# Patient Record
Sex: FEMALE | Race: WHITE | Employment: OTHER | URBAN - METROPOLITAN AREA
[De-identification: names, ages, dates, MRNs, and addresses within clinical notes are randomized per-mention and may not be internally consistent; named-entity substitution may affect disease eponyms.]

---

## 2017-02-24 PROBLEM — I10 ESSENTIAL HYPERTENSION WITH GOAL BLOOD PRESSURE LESS THAN 130/85: Status: ACTIVE | Noted: 2017-02-24

## 2017-03-22 PROBLEM — L29.9 PRURITUS: Status: ACTIVE | Noted: 2017-03-22

## 2017-04-06 PROBLEM — L29.9 PRURITUS: Status: RESOLVED | Noted: 2017-03-22 | Resolved: 2017-04-06

## 2018-09-27 ENCOUNTER — OFFICE VISIT (OUTPATIENT)
Dept: FAMILY MEDICINE CLINIC | Facility: CLINIC | Age: 73
End: 2018-09-27
Payer: MEDICARE

## 2018-09-27 VITALS
SYSTOLIC BLOOD PRESSURE: 124 MMHG | BODY MASS INDEX: 19.11 KG/M2 | HEIGHT: 69 IN | RESPIRATION RATE: 15 BRPM | TEMPERATURE: 98 F | DIASTOLIC BLOOD PRESSURE: 82 MMHG | OXYGEN SATURATION: 98 % | HEART RATE: 66 BPM | WEIGHT: 129 LBS

## 2018-09-27 DIAGNOSIS — J30.9 ALLERGIC RHINITIS, UNSPECIFIED SEASONALITY, UNSPECIFIED TRIGGER: Primary | ICD-10-CM

## 2018-09-27 PROCEDURE — 99202 OFFICE O/P NEW SF 15 MIN: CPT | Performed by: NURSE PRACTITIONER

## 2018-09-27 RX ORDER — SITAGLIPTIN 100 MG/1
TABLET, FILM COATED ORAL
COMMUNITY
Start: 2018-07-17

## 2018-09-27 NOTE — PATIENT INSTRUCTIONS
You can continue flonase 2 sprays to each side of the nose, once daily. You can also take over the counter Claritin (the generic is loratadine) once daily. Follow up with your doctor for any worsening symptoms.      Allergic Rhinitis  Allergic rhinitis is Cockroaches:  · Store food in sealed containers. · Remove garbage from the home promptly. · Fix water leaks  Mold:  · Keep humidity low by using a dehumidifier or air conditioner. Keep the dehumidifier and air conditioner clean and free of mold.   · Clean

## 2023-02-19 ENCOUNTER — APPOINTMENT (OUTPATIENT)
Dept: CT IMAGING | Age: 78
End: 2023-02-19
Attending: EMERGENCY MEDICINE

## 2023-02-19 ENCOUNTER — HOSPITAL ENCOUNTER (EMERGENCY)
Age: 78
Discharge: HOME | End: 2023-02-19
Attending: EMERGENCY MEDICINE

## 2023-02-19 VITALS
WEIGHT: 115 LBS | HEART RATE: 63 BPM | SYSTOLIC BLOOD PRESSURE: 146 MMHG | TEMPERATURE: 97.8 F | HEIGHT: 69 IN | OXYGEN SATURATION: 94 % | DIASTOLIC BLOOD PRESSURE: 93 MMHG | BODY MASS INDEX: 17.03 KG/M2 | RESPIRATION RATE: 20 BRPM

## 2023-02-19 DIAGNOSIS — S01.01XA LACERATION OF SCALP, INITIAL ENCOUNTER: Primary | ICD-10-CM

## 2023-02-19 DIAGNOSIS — S09.8XXA BLUNT HEAD TRAUMA, INITIAL ENCOUNTER: ICD-10-CM

## 2023-02-19 PROCEDURE — 99284 EMERGENCY DEPT VISIT MOD MDM: CPT

## 2023-02-19 PROCEDURE — 12001 RPR S/N/AX/GEN/TRNK 2.5CM/<: CPT

## 2023-02-19 PROCEDURE — G1004 CDSM NDSC: HCPCS

## 2023-02-19 PROCEDURE — 72125 CT NECK SPINE W/O DYE: CPT

## 2023-02-19 PROCEDURE — 70450 CT HEAD/BRAIN W/O DYE: CPT

## 2023-02-19 ASSESSMENT — PAIN SCALES - GENERAL: PAINLEVEL_OUTOF10: 5
